# Patient Record
Sex: FEMALE | NOT HISPANIC OR LATINO | URBAN - METROPOLITAN AREA
[De-identification: names, ages, dates, MRNs, and addresses within clinical notes are randomized per-mention and may not be internally consistent; named-entity substitution may affect disease eponyms.]

---

## 2023-08-14 ENCOUNTER — HOSPITAL ENCOUNTER (OUTPATIENT)
Dept: DATA CONVERSION | Facility: HOSPITAL | Age: 58
Discharge: HOME | End: 2023-08-14

## 2023-08-14 DIAGNOSIS — R87.610 ATYPICAL SQUAMOUS CELLS OF UNDETERMINED SIGNIFICANCE ON CYTOLOGIC SMEAR OF CERVIX (ASC-US): ICD-10-CM

## 2023-08-14 DIAGNOSIS — Z01.419 ENCOUNTER FOR GYNECOLOGICAL EXAMINATION (GENERAL) (ROUTINE) WITHOUT ABNORMAL FINDINGS: ICD-10-CM

## 2023-08-14 DIAGNOSIS — R87.810 CERVICAL HIGH RISK HUMAN PAPILLOMAVIRUS (HPV) DNA TEST POSITIVE: ICD-10-CM

## 2023-09-12 ENCOUNTER — HOSPITAL ENCOUNTER (OUTPATIENT)
Dept: DATA CONVERSION | Facility: HOSPITAL | Age: 58
End: 2023-09-12

## 2023-09-12 DIAGNOSIS — Z12.31 ENCOUNTER FOR SCREENING MAMMOGRAM FOR MALIGNANT NEOPLASM OF BREAST: ICD-10-CM

## 2023-09-12 DIAGNOSIS — Z72.0 TOBACCO USE: ICD-10-CM

## 2023-09-30 ENCOUNTER — HOSPITAL ENCOUNTER (OUTPATIENT)
Dept: RADIOLOGY | Facility: HOSPITAL | Age: 58
Discharge: HOME | End: 2023-09-30
Payer: COMMERCIAL

## 2023-09-30 ENCOUNTER — APPOINTMENT (OUTPATIENT)
Dept: RADIOLOGY | Facility: HOSPITAL | Age: 58
End: 2023-09-30

## 2023-09-30 VITALS — WEIGHT: 170 LBS | BODY MASS INDEX: 28.32 KG/M2 | HEIGHT: 65 IN

## 2023-09-30 DIAGNOSIS — Z12.31 ENCOUNTER FOR SCREENING MAMMOGRAM FOR MALIGNANT NEOPLASM OF BREAST: ICD-10-CM

## 2023-09-30 DIAGNOSIS — Z72.0 TOBACCO USE: ICD-10-CM

## 2023-09-30 PROCEDURE — 77067 SCR MAMMO BI INCL CAD: CPT | Mod: 50

## 2023-09-30 PROCEDURE — 77063 BREAST TOMOSYNTHESIS BI: CPT | Mod: 50

## 2023-09-30 PROCEDURE — 71271 CT THORAX LUNG CANCER SCR C-: CPT

## 2023-10-27 ENCOUNTER — HOSPITAL ENCOUNTER (OUTPATIENT)
Dept: RADIOLOGY | Facility: HOSPITAL | Age: 58
Discharge: HOME | End: 2023-10-27
Payer: COMMERCIAL

## 2023-10-27 DIAGNOSIS — R92.8 OTHER ABNORMAL AND INCONCLUSIVE FINDINGS ON DIAGNOSTIC IMAGING OF BREAST: ICD-10-CM

## 2023-10-27 PROCEDURE — 77065 DX MAMMO INCL CAD UNI: CPT | Mod: LT

## 2023-10-27 PROCEDURE — 76642 ULTRASOUND BREAST LIMITED: CPT | Mod: LT

## 2024-01-24 PROBLEM — F41.9 ANXIETY AND DEPRESSION: Status: ACTIVE | Noted: 2023-09-12

## 2024-01-24 PROBLEM — K21.9 GASTROESOPHAGEAL REFLUX DISEASE: Status: ACTIVE | Noted: 2021-10-30

## 2024-01-24 PROBLEM — F32.A ANXIETY AND DEPRESSION: Status: ACTIVE | Noted: 2023-09-12

## 2024-01-24 NOTE — PROGRESS NOTES
Subjective   Patient ID:   Marni Harkins is a 58 y.o. female who presents for No chief complaint on file..  HPI  New patient here today to establish care with myself.  Previous PCP: Vicky Cruz PA-C  Last seen: Sep 2023.    GERD:  Taking Prilosec.    Anxiety/Depression:  Taking Cymbalta.  Denies SI/HI.    Health maintenance:  Smoking:  Mammogram (40-75): Sep 2023 - abnormal. Diagnostic normal in Oct 2023.  Labs: July 2023.  Colonoscopy (50-75): DUE  Influenza:    Review of Systems  12 point review of systems negative unless stated above in HPI    There were no vitals filed for this visit.    Physical Exam  General: Alert and oriented, well nourished, no acute distress.  Lungs: Clear to auscultation, non-labored respiration.  Heart: Normal rate, regular rhythm, no murmur, gallop or edema.  Neurologic: Awake, alert, and oriented X3, CN II-XII intact.  Psychiatric: Cooperative, appropriate mood and affect.    Assessment/Plan   Diagnoses and all orders for this visit:  Anxiety and depression  Gastroesophageal reflux disease without esophagitis

## 2024-01-25 ENCOUNTER — APPOINTMENT (OUTPATIENT)
Dept: PRIMARY CARE | Facility: CLINIC | Age: 59
End: 2024-01-25
Payer: COMMERCIAL

## 2024-01-26 ENCOUNTER — APPOINTMENT (OUTPATIENT)
Dept: PRIMARY CARE | Facility: CLINIC | Age: 59
End: 2024-01-26
Payer: COMMERCIAL

## 2024-02-01 ENCOUNTER — OFFICE VISIT (OUTPATIENT)
Dept: PRIMARY CARE | Facility: CLINIC | Age: 59
End: 2024-02-01
Payer: COMMERCIAL

## 2024-02-01 VITALS
HEIGHT: 64 IN | BODY MASS INDEX: 31.79 KG/M2 | OXYGEN SATURATION: 97 % | WEIGHT: 186.2 LBS | RESPIRATION RATE: 18 BRPM | TEMPERATURE: 98.1 F | HEART RATE: 84 BPM | DIASTOLIC BLOOD PRESSURE: 84 MMHG | SYSTOLIC BLOOD PRESSURE: 140 MMHG

## 2024-02-01 DIAGNOSIS — R42 DIZZINESS: Primary | ICD-10-CM

## 2024-02-01 PROCEDURE — 99213 OFFICE O/P EST LOW 20 MIN: CPT

## 2024-02-01 RX ORDER — DULOXETINE HYDROCHLORIDE 20 MG/1
20 CAPSULE, DELAYED RELEASE ORAL
COMMUNITY
Start: 2023-09-12 | End: 2024-02-01

## 2024-02-01 RX ORDER — MECLIZINE HYDROCHLORIDE 25 MG/1
25 TABLET ORAL 3 TIMES DAILY PRN
Qty: 30 TABLET | Refills: 11 | Status: SHIPPED | OUTPATIENT
Start: 2024-02-01 | End: 2025-01-31

## 2024-02-01 RX ORDER — DOCOSAHEXAENOIC ACID/EPA 120-180 MG
CAPSULE ORAL DAILY
COMMUNITY

## 2024-02-01 RX ORDER — OMEPRAZOLE 20 MG/1
20 CAPSULE, DELAYED RELEASE ORAL
COMMUNITY
Start: 2024-01-22

## 2024-02-01 ASSESSMENT — PATIENT HEALTH QUESTIONNAIRE - PHQ9
SUM OF ALL RESPONSES TO PHQ9 QUESTIONS 1 & 2: 1
2. FEELING DOWN, DEPRESSED OR HOPELESS: SEVERAL DAYS
10. IF YOU CHECKED OFF ANY PROBLEMS, HOW DIFFICULT HAVE THESE PROBLEMS MADE IT FOR YOU TO DO YOUR WORK, TAKE CARE OF THINGS AT HOME, OR GET ALONG WITH OTHER PEOPLE: NOT DIFFICULT AT ALL
1. LITTLE INTEREST OR PLEASURE IN DOING THINGS: NOT AT ALL

## 2024-02-01 ASSESSMENT — PAIN SCALES - GENERAL: PAINLEVEL: 0-NO PAIN

## 2024-02-01 ASSESSMENT — COLUMBIA-SUICIDE SEVERITY RATING SCALE - C-SSRS
6. HAVE YOU EVER DONE ANYTHING, STARTED TO DO ANYTHING, OR PREPARED TO DO ANYTHING TO END YOUR LIFE?: NO
2. HAVE YOU ACTUALLY HAD ANY THOUGHTS OF KILLING YOURSELF?: NO
1. IN THE PAST MONTH, HAVE YOU WISHED YOU WERE DEAD OR WISHED YOU COULD GO TO SLEEP AND NOT WAKE UP?: NO

## 2024-02-01 NOTE — PROGRESS NOTES
"Subjective   Patient ID: Marni Harkins is a 58 y.o. female who presents for medication adjustment and not feeling well Dizzy.    HPI   Marni presents for concerns of dizziness following abruptly discontinuing her Cymbalta about 3 weeks ago. Admits the dizziness has been somewhat improved over the last few days. She reports she feels dizzy when she's tired most days but she feels some days are worse than other. She reports the dizziness hits her in waves, they come an go quickly, no other symptoms present with dizziness. Denies N/V. Denies vision changes, photophobia. Denies any hearing changes.  Dizziness is not associated with any particular location, occurs at home and at work. She admits that she only sleeps about 5-6 hours per night, wakes very early for work at 3:45 am daily.    Review of Systems   Constitutional:  Negative for activity change, appetite change, chills, diaphoresis, fatigue, fever and unexpected weight change.   HENT:  Negative for congestion, ear discharge, ear pain, hearing loss, postnasal drip, rhinorrhea, sinus pressure and tinnitus.    Eyes:  Negative for photophobia and visual disturbance.   Respiratory: Negative.     Cardiovascular:  Negative for chest pain and palpitations.   Gastrointestinal: Negative.    Skin: Negative.    Neurological:  Positive for dizziness. Negative for seizures, speech difficulty, weakness, light-headedness, numbness and headaches.         Objective   /84   Pulse 84   Temp 36.7 °C (98.1 °F)   Resp 18   Ht 1.626 m (5' 4\")   Wt 84.5 kg (186 lb 3.2 oz)   SpO2 97%   BMI 31.96 kg/m²     Physical Exam  Vitals and nursing note reviewed.   Constitutional:       General: She is not in acute distress.     Appearance: Normal appearance.   HENT:      Right Ear: Tympanic membrane, ear canal and external ear normal.      Left Ear: Tympanic membrane, ear canal and external ear normal.      Nose: Nose normal. No congestion or rhinorrhea.   Cardiovascular:    "   Rate and Rhythm: Normal rate and regular rhythm.      Heart sounds: Normal heart sounds.   Pulmonary:      Effort: Pulmonary effort is normal.      Breath sounds: Normal breath sounds.   Skin:     General: Skin is warm and dry.      Capillary Refill: Capillary refill takes less than 2 seconds.   Neurological:      General: No focal deficit present.      Mental Status: She is alert and oriented to person, place, and time.      Cranial Nerves: No cranial nerve deficit.      Sensory: No sensory deficit.      Motor: No weakness.      Coordination: Coordination normal.      Gait: Gait normal.         Assessment/Plan   Problem List Items Addressed This Visit    None  Visit Diagnoses         Codes    Dizziness    -  Primary  Acute, improving.  Physical exam unremarkable.  Discussed possible causes for dizziness. Dizziness may be associated with discontinuing medication abruptly. She is to keep a record of episodes and the events surrounding for further evaluation. May use Antivert if needed. Explained intended effects, potential side effects, and schedule of dosages of the medication.  Will obtain labs to r/o electrolyte imbalance, dehydration, or anemia as discussed.  R42    Relevant Medications    meclizine (Antivert) 25 mg tablet    Other Relevant Orders    CBC and Auto Differential    Comprehensive metabolic panel

## 2024-02-04 ASSESSMENT — ENCOUNTER SYMPTOMS
PHOTOPHOBIA: 0
NUMBNESS: 0
APPETITE CHANGE: 0
UNEXPECTED WEIGHT CHANGE: 0
ACTIVITY CHANGE: 0
LIGHT-HEADEDNESS: 0
RESPIRATORY NEGATIVE: 1
RHINORRHEA: 0
DIZZINESS: 1
GASTROINTESTINAL NEGATIVE: 1
HEADACHES: 0
SEIZURES: 0
CHILLS: 0
PALPITATIONS: 0
FATIGUE: 0
FEVER: 0
WEAKNESS: 0
SINUS PRESSURE: 0
DIAPHORESIS: 0
SPEECH DIFFICULTY: 0

## 2024-02-21 ENCOUNTER — LAB (OUTPATIENT)
Dept: LAB | Facility: LAB | Age: 59
End: 2024-02-21
Payer: COMMERCIAL

## 2024-02-21 DIAGNOSIS — R42 DIZZINESS: ICD-10-CM

## 2024-02-21 LAB
ALBUMIN SERPL-MCNC: 4.3 G/DL (ref 3.5–5)
ALP BLD-CCNC: 30 U/L (ref 35–125)
ALT SERPL-CCNC: 17 U/L (ref 5–40)
ANION GAP SERPL CALC-SCNC: 13 MMOL/L
AST SERPL-CCNC: 16 U/L (ref 5–40)
BASOPHILS # BLD AUTO: 0.06 X10*3/UL (ref 0–0.1)
BASOPHILS NFR BLD AUTO: 0.8 %
BILIRUB SERPL-MCNC: <0.2 MG/DL (ref 0.1–1.2)
BUN SERPL-MCNC: 15 MG/DL (ref 8–25)
CALCIUM SERPL-MCNC: 10 MG/DL (ref 8.5–10.4)
CHLORIDE SERPL-SCNC: 102 MMOL/L (ref 97–107)
CO2 SERPL-SCNC: 26 MMOL/L (ref 24–31)
CREAT SERPL-MCNC: 1 MG/DL (ref 0.4–1.6)
EGFRCR SERPLBLD CKD-EPI 2021: 65 ML/MIN/1.73M*2
EOSINOPHIL # BLD AUTO: 0.18 X10*3/UL (ref 0–0.7)
EOSINOPHIL NFR BLD AUTO: 2.5 %
ERYTHROCYTE [DISTWIDTH] IN BLOOD BY AUTOMATED COUNT: 13.6 % (ref 11.5–14.5)
GLUCOSE SERPL-MCNC: 91 MG/DL (ref 65–99)
HCT VFR BLD AUTO: 39.8 % (ref 36–46)
HGB BLD-MCNC: 12.8 G/DL (ref 12–16)
IMM GRANULOCYTES # BLD AUTO: 0.02 X10*3/UL (ref 0–0.7)
IMM GRANULOCYTES NFR BLD AUTO: 0.3 % (ref 0–0.9)
LYMPHOCYTES # BLD AUTO: 2.33 X10*3/UL (ref 1.2–4.8)
LYMPHOCYTES NFR BLD AUTO: 32.9 %
MCH RBC QN AUTO: 28.7 PG (ref 26–34)
MCHC RBC AUTO-ENTMCNC: 32.2 G/DL (ref 32–36)
MCV RBC AUTO: 89 FL (ref 80–100)
MONOCYTES # BLD AUTO: 0.67 X10*3/UL (ref 0.1–1)
MONOCYTES NFR BLD AUTO: 9.5 %
NEUTROPHILS # BLD AUTO: 3.82 X10*3/UL (ref 1.2–7.7)
NEUTROPHILS NFR BLD AUTO: 54 %
NRBC BLD-RTO: 0 /100 WBCS (ref 0–0)
PLATELET # BLD AUTO: 339 X10*3/UL (ref 150–450)
POTASSIUM SERPL-SCNC: 4.4 MMOL/L (ref 3.4–5.1)
PROT SERPL-MCNC: 7 G/DL (ref 5.9–7.9)
RBC # BLD AUTO: 4.46 X10*6/UL (ref 4–5.2)
SODIUM SERPL-SCNC: 141 MMOL/L (ref 133–145)
WBC # BLD AUTO: 7.1 X10*3/UL (ref 4.4–11.3)

## 2024-02-21 PROCEDURE — 36415 COLL VENOUS BLD VENIPUNCTURE: CPT

## 2024-02-21 PROCEDURE — 80053 COMPREHEN METABOLIC PANEL: CPT

## 2024-02-21 PROCEDURE — 85025 COMPLETE CBC W/AUTO DIFF WBC: CPT

## 2024-03-14 ENCOUNTER — OFFICE VISIT (OUTPATIENT)
Dept: PRIMARY CARE | Facility: CLINIC | Age: 59
End: 2024-03-14
Payer: COMMERCIAL

## 2024-03-14 VITALS
HEART RATE: 81 BPM | WEIGHT: 189.2 LBS | SYSTOLIC BLOOD PRESSURE: 142 MMHG | OXYGEN SATURATION: 96 % | BODY MASS INDEX: 32.48 KG/M2 | DIASTOLIC BLOOD PRESSURE: 80 MMHG

## 2024-03-14 DIAGNOSIS — I10 PRIMARY HYPERTENSION: Primary | ICD-10-CM

## 2024-03-14 DIAGNOSIS — R42 DIZZINESS: ICD-10-CM

## 2024-03-14 PROCEDURE — 3077F SYST BP >= 140 MM HG: CPT

## 2024-03-14 PROCEDURE — 3079F DIAST BP 80-89 MM HG: CPT

## 2024-03-14 PROCEDURE — 99214 OFFICE O/P EST MOD 30 MIN: CPT

## 2024-03-14 RX ORDER — LISINOPRIL 5 MG/1
5 TABLET ORAL DAILY
Qty: 30 TABLET | Refills: 0 | Status: SHIPPED | OUTPATIENT
Start: 2024-03-14 | End: 2024-04-15 | Stop reason: SDUPTHER

## 2024-03-14 ASSESSMENT — PATIENT HEALTH QUESTIONNAIRE - PHQ9
2. FEELING DOWN, DEPRESSED OR HOPELESS: NOT AT ALL
1. LITTLE INTEREST OR PLEASURE IN DOING THINGS: NOT AT ALL
2. FEELING DOWN, DEPRESSED OR HOPELESS: NOT AT ALL
1. LITTLE INTEREST OR PLEASURE IN DOING THINGS: NOT AT ALL
SUM OF ALL RESPONSES TO PHQ9 QUESTIONS 1 AND 2: 0
SUM OF ALL RESPONSES TO PHQ9 QUESTIONS 1 AND 2: 0

## 2024-03-14 ASSESSMENT — PAIN SCALES - GENERAL: PAINLEVEL: 0-NO PAIN

## 2024-03-14 NOTE — PROGRESS NOTES
Subjective   Patient ID: Marni Harkins is a 58 y.o. female who presents for Hypertension, Dizziness (At times ), and Medication Question (Rx for urinary leakage /Rx dry lips-was given something from [facility] previously ).    HPI     Marni presents for concerns for elevated BP.  Her BP was elevated at her last visit 140/84 and 142/80 at this visit.  She has been tracking her home BPs which has been 130-140s/70s-80s.   She reports occasional dizziness.  Reports episodes occur on occasion with no regularity, not tied with any other symptoms- no syncope, changes in metal status. Denies any N/V.  Denies any changes in vision, blurred vision or double vision. Denies chest pain, palpitations. Denies any recent illness.  Episodes occur at different times of day but seem to happen most often when she is tired. She was seen for same symptoms last most, was concerned that her dizziness was connected with a abrupt discontinue of her cymbalta. She does admit that her dizziness has improved since her last visit, with only occasional episodes which resolve quickly.  She reports that she tries to eat healthy but admits she does enjoy her italian foods, high in carbs.  She tried to get to the gym 12 times per months.  Reports she drinks plenty of water daily.     Review of Systems   Constitutional:  Negative for activity change, appetite change, fatigue and unexpected weight change.   Eyes:  Negative for visual disturbance.   Respiratory:  Negative for cough, chest tightness and shortness of breath.    Cardiovascular:  Negative for chest pain, palpitations and leg swelling.   Gastrointestinal:  Negative for abdominal pain, constipation, diarrhea, nausea and vomiting.   Genitourinary:  Negative for dysuria, frequency and urgency.   Skin:  Negative for color change and rash.   Neurological:  Positive for dizziness. Negative for syncope, weakness, light-headedness and headaches.       Objective   /80   Pulse 81   Wt 85.8 kg  (189 lb 3.2 oz)   SpO2 96%   BMI 32.48 kg/m²     Physical Exam  Vitals and nursing note reviewed.   Constitutional:       General: She is not in acute distress.     Appearance: Normal appearance.   Eyes:      Extraocular Movements: Extraocular movements intact.      Conjunctiva/sclera: Conjunctivae normal.      Pupils: Pupils are equal, round, and reactive to light.   Neck:      Vascular: No carotid bruit.   Cardiovascular:      Rate and Rhythm: Normal rate and regular rhythm.      Pulses: Normal pulses.      Heart sounds: Normal heart sounds, S1 normal and S2 normal. No murmur heard.  Pulmonary:      Effort: Pulmonary effort is normal.      Breath sounds: Normal breath sounds.   Musculoskeletal:      Cervical back: Normal range of motion and neck supple.      Right lower leg: No edema.      Left lower leg: No edema.   Lymphadenopathy:      Cervical: No cervical adenopathy.   Skin:     General: Skin is warm and dry.      Capillary Refill: Capillary refill takes less than 2 seconds.   Neurological:      General: No focal deficit present.      Mental Status: She is alert and oriented to person, place, and time. Mental status is at baseline.   Psychiatric:         Mood and Affect: Mood normal.         Behavior: Behavior normal.         Thought Content: Thought content normal.         Judgment: Judgment normal.         Assessment/Plan   Problem List Items Addressed This Visit    None  Visit Diagnoses         Codes    Primary hypertension    -  Primary  Begin lisinopril 5 mg daily as discussed for better BP control.   Explained intended effects, potential side effects, and schedule of dosages of the medication.  Discussed reducing sodium intake and processed foods.   Continue with home monitoring.  Follow up in 1 month for reevaluation.  I10    Relevant Medications    lisinopril 5 mg tablet    Dizziness      Dizziness may be connected to perpetually elevated BP.    Physical exam at last visit and this visit are  unremarkable.  Patient will continue to monitor, follow up in 1 month with BP recheck.  Discussed sooner follow up if symptoms seem to change or worsen.  R42

## 2024-03-15 ENCOUNTER — TELEPHONE (OUTPATIENT)
Dept: PRIMARY CARE | Facility: CLINIC | Age: 59
End: 2024-03-15
Payer: COMMERCIAL

## 2024-03-15 NOTE — TELEPHONE ENCOUNTER
PT CHECKED WITH HER INSURANCE AND THEY DO NOT COVER WEIGHT LOSS  INJECTIONS WITHOUT A PA.  THEY WILL COVER METFORMIN .

## 2024-03-17 ASSESSMENT — ENCOUNTER SYMPTOMS
DIARRHEA: 0
LIGHT-HEADEDNESS: 0
COLOR CHANGE: 0
WEAKNESS: 0
ABDOMINAL PAIN: 0
FATIGUE: 0
DYSURIA: 0
COUGH: 0
ACTIVITY CHANGE: 0
CHEST TIGHTNESS: 0
PALPITATIONS: 0
FREQUENCY: 0
HEADACHES: 0
VOMITING: 0
CONSTIPATION: 0
UNEXPECTED WEIGHT CHANGE: 0
NAUSEA: 0
DIZZINESS: 1
SHORTNESS OF BREATH: 0
APPETITE CHANGE: 0

## 2024-04-12 PROBLEM — E78.00 PURE HYPERCHOLESTEROLEMIA: Status: ACTIVE | Noted: 2023-01-16

## 2024-04-12 PROBLEM — M26.629 TMJ SYNDROME: Status: ACTIVE | Noted: 2019-04-23

## 2024-04-12 PROBLEM — E04.9 GOITER: Status: ACTIVE | Noted: 2019-03-11

## 2024-04-12 PROBLEM — M77.9 ENTHESOPATHY: Status: ACTIVE | Noted: 2021-10-30

## 2024-04-12 PROBLEM — R73.01 IMPAIRED FASTING GLUCOSE: Status: ACTIVE | Noted: 2021-02-04

## 2024-04-12 PROBLEM — R87.810 ASCUS WITH POSITIVE HIGH RISK HPV CERVICAL: Status: ACTIVE | Noted: 2020-05-14

## 2024-04-12 PROBLEM — M72.2 PLANTAR FASCIITIS: Status: ACTIVE | Noted: 2018-10-29

## 2024-04-12 PROBLEM — F43.23 ADJUSTMENT DISORDER WITH MIXED ANXIETY AND DEPRESSED MOOD: Status: ACTIVE | Noted: 2021-10-30

## 2024-04-12 PROBLEM — R87.610 ASCUS WITH POSITIVE HIGH RISK HPV CERVICAL: Status: ACTIVE | Noted: 2020-05-14

## 2024-04-12 PROBLEM — N87.9 CERVICAL ATYPIA: Status: ACTIVE | Noted: 2023-08-14

## 2024-04-12 PROBLEM — R87.810 HIGH-RISK HUMAN PAPILLOMAVIRUS (HPV) DNA DETECTED IN CERVICAL SPECIMEN: Status: ACTIVE | Noted: 2023-08-14

## 2024-04-15 ENCOUNTER — OFFICE VISIT (OUTPATIENT)
Dept: PRIMARY CARE | Facility: CLINIC | Age: 59
End: 2024-04-15
Payer: COMMERCIAL

## 2024-04-15 ENCOUNTER — TELEPHONE (OUTPATIENT)
Dept: PRIMARY CARE | Facility: CLINIC | Age: 59
End: 2024-04-15

## 2024-04-15 VITALS
TEMPERATURE: 98.1 F | BODY MASS INDEX: 31.41 KG/M2 | SYSTOLIC BLOOD PRESSURE: 120 MMHG | HEART RATE: 77 BPM | OXYGEN SATURATION: 97 % | HEIGHT: 64 IN | WEIGHT: 184 LBS | DIASTOLIC BLOOD PRESSURE: 68 MMHG

## 2024-04-15 DIAGNOSIS — E78.00 PURE HYPERCHOLESTEROLEMIA: ICD-10-CM

## 2024-04-15 DIAGNOSIS — I10 PRIMARY HYPERTENSION: ICD-10-CM

## 2024-04-15 DIAGNOSIS — Z00.00 ANNUAL PHYSICAL EXAM: Primary | ICD-10-CM

## 2024-04-15 PROCEDURE — 3078F DIAST BP <80 MM HG: CPT

## 2024-04-15 PROCEDURE — 99213 OFFICE O/P EST LOW 20 MIN: CPT

## 2024-04-15 PROCEDURE — 3074F SYST BP LT 130 MM HG: CPT

## 2024-04-15 RX ORDER — LISINOPRIL 5 MG/1
5 TABLET ORAL DAILY
Qty: 90 TABLET | Refills: 1 | Status: SHIPPED | OUTPATIENT
Start: 2024-04-15 | End: 2024-10-12

## 2024-04-15 ASSESSMENT — ENCOUNTER SYMPTOMS
ABDOMINAL PAIN: 0
NAUSEA: 0
VOMITING: 0
LIGHT-HEADEDNESS: 0
ACTIVITY CHANGE: 0
WEAKNESS: 0
CHEST TIGHTNESS: 0
UNEXPECTED WEIGHT CHANGE: 0
FREQUENCY: 0
HEADACHES: 0
FATIGUE: 0
COUGH: 0
DIZZINESS: 0
COLOR CHANGE: 0
PALPITATIONS: 0
CONSTIPATION: 0
APPETITE CHANGE: 0
DIARRHEA: 0
SHORTNESS OF BREATH: 0
DYSURIA: 0

## 2024-04-15 ASSESSMENT — PAIN SCALES - GENERAL: PAINLEVEL: 0-NO PAIN

## 2024-04-15 ASSESSMENT — PATIENT HEALTH QUESTIONNAIRE - PHQ9
1. LITTLE INTEREST OR PLEASURE IN DOING THINGS: NOT AT ALL
SUM OF ALL RESPONSES TO PHQ9 QUESTIONS 1 AND 2: 0
2. FEELING DOWN, DEPRESSED OR HOPELESS: NOT AT ALL

## 2024-04-15 NOTE — PROGRESS NOTES
"Subjective   Patient ID: Marni Harkins is a 58 y.o. female who presents for Blood Pressure Check.    HPI     Marni presents for follow up of essential hypertension.   Taking lisinopril 5 mg daily.  Tolerating medications well.  /68  Denies change in vision, headache, or dizziness.   No complaints of chest pain, palpitations, or shortness of breath.      Review of Systems   Constitutional:  Negative for activity change, appetite change, fatigue and unexpected weight change.   Eyes:  Negative for visual disturbance.   Respiratory:  Negative for cough, chest tightness and shortness of breath.    Cardiovascular:  Negative for chest pain, palpitations and leg swelling.   Gastrointestinal:  Negative for abdominal pain, constipation, diarrhea, nausea and vomiting.   Genitourinary:  Negative for dysuria, frequency and urgency.   Skin:  Negative for color change and rash.   Neurological:  Negative for dizziness, syncope, weakness, light-headedness and headaches.             Objective   /68 (BP Location: Left arm)   Pulse 77   Temp 36.7 °C (98.1 °F) (Temporal)   Ht 1.626 m (5' 4\")   Wt 83.5 kg (184 lb)   SpO2 97%   BMI 31.58 kg/m²     Physical Exam  Vitals and nursing note reviewed.   Constitutional:       General: She is not in acute distress.     Appearance: Normal appearance.   Neck:      Vascular: No carotid bruit.   Cardiovascular:      Rate and Rhythm: Normal rate and regular rhythm.      Pulses: Normal pulses.      Heart sounds: Normal heart sounds, S1 normal and S2 normal. No murmur heard.  Pulmonary:      Effort: Pulmonary effort is normal.      Breath sounds: Normal breath sounds and air entry.   Musculoskeletal:      Cervical back: Normal range of motion and neck supple.      Right lower leg: No edema.      Left lower leg: No edema.   Lymphadenopathy:      Cervical: No cervical adenopathy.   Skin:     General: Skin is warm and dry.   Neurological:      General: No focal deficit present. "      Mental Status: She is alert. Mental status is at baseline.   Psychiatric:         Behavior: Behavior is cooperative.           Assessment/Plan   Problem List Items Addressed This Visit    None  Visit Diagnoses         Codes    Primary hypertension      Chronic condition, stable at this visit  Continue lisinopril 5 mg daily as prescribed.  Monitor home blood pressures.  Call if blood pressure consistently >140/90.  Non pharmacological interventions such as lowering salt, saturated fats, cholesterol, and sugar diet discussed.  Increase physical activity, aim for 30 minutes 5 days per week as able.  Stress reduction interventions discussed.  Discussed signs and symptoms of major cardiovascular event and need to present to the ED.   Reevaluate in 6 months.   I10    Relevant Medications    lisinopril 5 mg tablet

## 2024-09-12 ENCOUNTER — TELEPHONE (OUTPATIENT)
Dept: PRIMARY CARE | Facility: CLINIC | Age: 59
End: 2024-09-12
Payer: COMMERCIAL

## 2024-09-12 DIAGNOSIS — I10 PRIMARY HYPERTENSION: Primary | ICD-10-CM

## 2024-09-12 RX ORDER — LOSARTAN POTASSIUM 25 MG/1
25 TABLET ORAL DAILY
Qty: 30 TABLET | Refills: 1 | Status: SHIPPED | OUTPATIENT
Start: 2024-09-12 | End: 2024-10-08 | Stop reason: SDUPTHER

## 2024-09-12 NOTE — TELEPHONE ENCOUNTER
Patient stated one of the side effects of Lisinopril is a cough, the cough is getting worse. Patient is requesting a new medication. Offered Patient an appointment for medication change.    Patient stated Omeprazole isn't working and she would like to try something new.    Patient's CPE is 10-25-24.    Patient can be reached at [phone]

## 2024-09-19 PROCEDURE — 88175 CYTOPATH C/V AUTO FLUID REDO: CPT

## 2024-09-19 PROCEDURE — 87624 HPV HI-RISK TYP POOLED RSLT: CPT

## 2024-09-19 PROCEDURE — 88141 CYTOPATH C/V INTERPRET: CPT | Performed by: PATHOLOGY

## 2024-09-20 ENCOUNTER — LAB REQUISITION (OUTPATIENT)
Dept: LAB | Facility: HOSPITAL | Age: 59
End: 2024-09-20
Payer: COMMERCIAL

## 2024-09-20 DIAGNOSIS — R87.610 ATYPICAL SQUAMOUS CELLS OF UNDETERMINED SIGNIFICANCE ON CYTOLOGIC SMEAR OF CERVIX (ASC-US): ICD-10-CM

## 2024-09-20 DIAGNOSIS — R87.810 CERVICAL HIGH RISK HUMAN PAPILLOMAVIRUS (HPV) DNA TEST POSITIVE: ICD-10-CM

## 2024-09-20 DIAGNOSIS — Z11.51 ENCOUNTER FOR SCREENING FOR HUMAN PAPILLOMAVIRUS (HPV): ICD-10-CM

## 2024-09-20 DIAGNOSIS — Z01.419 ENCOUNTER FOR GYNECOLOGICAL EXAMINATION (GENERAL) (ROUTINE) WITHOUT ABNORMAL FINDINGS: ICD-10-CM

## 2024-09-23 NOTE — TELEPHONE ENCOUNTER
Attempted to call patient but phone went straight to voicemail . Left message for patient to give the office a call back regarding medication change

## 2024-09-24 NOTE — TELEPHONE ENCOUNTER
Spoke with patient. She has been taking losartan and states the coughing/GI issues seem to be calming down. Doesn't think she needs evaluated right now and that she will follow up at her CPE.

## 2024-10-05 DIAGNOSIS — I10 PRIMARY HYPERTENSION: ICD-10-CM

## 2024-10-07 RX ORDER — LISINOPRIL 5 MG/1
5 TABLET ORAL DAILY
Qty: 90 TABLET | Refills: 0 | Status: SHIPPED | OUTPATIENT
Start: 2024-10-07 | End: 2024-10-08 | Stop reason: ALTCHOICE

## 2024-10-08 ENCOUNTER — TELEPHONE (OUTPATIENT)
Dept: PRIMARY CARE | Facility: CLINIC | Age: 59
End: 2024-10-08
Payer: COMMERCIAL

## 2024-10-08 ENCOUNTER — HOSPITAL ENCOUNTER (OUTPATIENT)
Dept: RADIOLOGY | Facility: HOSPITAL | Age: 59
Discharge: HOME | End: 2024-10-08
Payer: COMMERCIAL

## 2024-10-08 DIAGNOSIS — I10 PRIMARY HYPERTENSION: ICD-10-CM

## 2024-10-08 DIAGNOSIS — Z87.891 PERSONAL HISTORY OF NICOTINE DEPENDENCE: ICD-10-CM

## 2024-10-08 PROCEDURE — 71271 CT THORAX LUNG CANCER SCR C-: CPT

## 2024-10-08 RX ORDER — LOSARTAN POTASSIUM 25 MG/1
25 TABLET ORAL DAILY
Qty: 90 TABLET | Refills: 1 | Status: SHIPPED | OUTPATIENT
Start: 2024-10-08 | End: 2025-04-06

## 2024-10-12 ENCOUNTER — LAB (OUTPATIENT)
Dept: LAB | Facility: LAB | Age: 59
End: 2024-10-12
Payer: COMMERCIAL

## 2024-10-12 DIAGNOSIS — I10 PRIMARY HYPERTENSION: ICD-10-CM

## 2024-10-12 DIAGNOSIS — Z00.00 ANNUAL PHYSICAL EXAM: ICD-10-CM

## 2024-10-12 DIAGNOSIS — E78.00 PURE HYPERCHOLESTEROLEMIA: ICD-10-CM

## 2024-10-12 LAB
ALBUMIN SERPL BCP-MCNC: 4.3 G/DL (ref 3.4–5)
ALP SERPL-CCNC: 22 U/L (ref 33–110)
ALT SERPL W P-5'-P-CCNC: 27 U/L (ref 7–45)
ANION GAP SERPL CALCULATED.3IONS-SCNC: 11 MMOL/L (ref 10–20)
APPEARANCE UR: CLEAR
AST SERPL W P-5'-P-CCNC: 20 U/L (ref 9–39)
BASOPHILS # BLD AUTO: 0.05 X10*3/UL (ref 0–0.1)
BASOPHILS NFR BLD AUTO: 0.8 %
BILIRUB SERPL-MCNC: 0.4 MG/DL (ref 0–1.2)
BILIRUB UR STRIP.AUTO-MCNC: NEGATIVE MG/DL
BUN SERPL-MCNC: 14 MG/DL (ref 6–23)
CALCIUM SERPL-MCNC: 9.4 MG/DL (ref 8.6–10.3)
CHLORIDE SERPL-SCNC: 104 MMOL/L (ref 98–107)
CHOLEST SERPL-MCNC: 246 MG/DL (ref 0–199)
CHOLEST/HDLC SERPL: 3.6 {RATIO}
CO2 SERPL-SCNC: 29 MMOL/L (ref 21–32)
COLOR UR: NORMAL
CREAT SERPL-MCNC: 0.8 MG/DL (ref 0.5–1.05)
CREAT UR-MCNC: 137.1 MG/DL (ref 20–320)
EGFRCR SERPLBLD CKD-EPI 2021: 85 ML/MIN/1.73M*2
EOSINOPHIL # BLD AUTO: 0.13 X10*3/UL (ref 0–0.7)
EOSINOPHIL NFR BLD AUTO: 2.1 %
ERYTHROCYTE [DISTWIDTH] IN BLOOD BY AUTOMATED COUNT: 13.6 % (ref 11.5–14.5)
GLUCOSE SERPL-MCNC: 90 MG/DL (ref 74–99)
GLUCOSE UR STRIP.AUTO-MCNC: NORMAL MG/DL
HCT VFR BLD AUTO: 41.3 % (ref 36–46)
HDLC SERPL-MCNC: 68.9 MG/DL
HGB BLD-MCNC: 13.3 G/DL (ref 12–16)
IMM GRANULOCYTES # BLD AUTO: 0.02 X10*3/UL (ref 0–0.7)
IMM GRANULOCYTES NFR BLD AUTO: 0.3 % (ref 0–0.9)
KETONES UR STRIP.AUTO-MCNC: NEGATIVE MG/DL
LDLC SERPL CALC-MCNC: 148 MG/DL
LEUKOCYTE ESTERASE UR QL STRIP.AUTO: NEGATIVE
LYMPHOCYTES # BLD AUTO: 2.02 X10*3/UL (ref 1.2–4.8)
LYMPHOCYTES NFR BLD AUTO: 32.8 %
MCH RBC QN AUTO: 29.1 PG (ref 26–34)
MCHC RBC AUTO-ENTMCNC: 32.2 G/DL (ref 32–36)
MCV RBC AUTO: 90 FL (ref 80–100)
MICROALBUMIN UR-MCNC: <7 MG/L
MICROALBUMIN/CREAT UR: NORMAL MG/G{CREAT}
MONOCYTES # BLD AUTO: 0.6 X10*3/UL (ref 0.1–1)
MONOCYTES NFR BLD AUTO: 9.7 %
NEUTROPHILS # BLD AUTO: 3.34 X10*3/UL (ref 1.2–7.7)
NEUTROPHILS NFR BLD AUTO: 54.3 %
NITRITE UR QL STRIP.AUTO: NEGATIVE
NON HDL CHOLESTEROL: 177 MG/DL (ref 0–149)
NRBC BLD-RTO: 0 /100 WBCS (ref 0–0)
PH UR STRIP.AUTO: 6.5 [PH]
PLATELET # BLD AUTO: 338 X10*3/UL (ref 150–450)
POTASSIUM SERPL-SCNC: 4 MMOL/L (ref 3.5–5.3)
PROT SERPL-MCNC: 6.9 G/DL (ref 6.4–8.2)
PROT UR STRIP.AUTO-MCNC: NEGATIVE MG/DL
RBC # BLD AUTO: 4.57 X10*6/UL (ref 4–5.2)
RBC # UR STRIP.AUTO: NEGATIVE /UL
SODIUM SERPL-SCNC: 140 MMOL/L (ref 136–145)
SP GR UR STRIP.AUTO: 1.02
TRIGL SERPL-MCNC: 146 MG/DL (ref 0–149)
UROBILINOGEN UR STRIP.AUTO-MCNC: NORMAL MG/DL
VLDL: 29 MG/DL (ref 0–40)
WBC # BLD AUTO: 6.2 X10*3/UL (ref 4.4–11.3)

## 2024-10-12 PROCEDURE — 85025 COMPLETE CBC W/AUTO DIFF WBC: CPT

## 2024-10-12 PROCEDURE — 82043 UR ALBUMIN QUANTITATIVE: CPT

## 2024-10-12 PROCEDURE — 80053 COMPREHEN METABOLIC PANEL: CPT

## 2024-10-12 PROCEDURE — 82570 ASSAY OF URINE CREATININE: CPT

## 2024-10-12 PROCEDURE — 80061 LIPID PANEL: CPT

## 2024-10-12 PROCEDURE — 81003 URINALYSIS AUTO W/O SCOPE: CPT

## 2024-10-12 PROCEDURE — 36415 COLL VENOUS BLD VENIPUNCTURE: CPT

## 2024-10-17 ENCOUNTER — APPOINTMENT (OUTPATIENT)
Dept: PRIMARY CARE | Facility: CLINIC | Age: 59
End: 2024-10-17
Payer: COMMERCIAL

## 2024-10-22 ENCOUNTER — HOSPITAL ENCOUNTER (OUTPATIENT)
Dept: RADIOLOGY | Facility: HOSPITAL | Age: 59
Discharge: HOME | End: 2024-10-22
Payer: COMMERCIAL

## 2024-10-22 VITALS — BODY MASS INDEX: 31.76 KG/M2 | HEIGHT: 64 IN | WEIGHT: 186 LBS

## 2024-10-22 DIAGNOSIS — Z12.31 ENCOUNTER FOR SCREENING MAMMOGRAM FOR MALIGNANT NEOPLASM OF BREAST: ICD-10-CM

## 2024-10-22 PROCEDURE — 77067 SCR MAMMO BI INCL CAD: CPT | Performed by: RADIOLOGY

## 2024-10-22 PROCEDURE — 77067 SCR MAMMO BI INCL CAD: CPT

## 2024-10-22 PROCEDURE — 77063 BREAST TOMOSYNTHESIS BI: CPT | Performed by: RADIOLOGY

## 2024-10-25 ENCOUNTER — TELEPHONE (OUTPATIENT)
Dept: PRIMARY CARE | Facility: CLINIC | Age: 59
End: 2024-10-25

## 2024-10-25 ENCOUNTER — APPOINTMENT (OUTPATIENT)
Dept: PRIMARY CARE | Facility: CLINIC | Age: 59
End: 2024-10-25
Payer: COMMERCIAL

## 2024-10-25 VITALS
BODY MASS INDEX: 32.61 KG/M2 | WEIGHT: 191 LBS | HEIGHT: 64 IN | SYSTOLIC BLOOD PRESSURE: 134 MMHG | OXYGEN SATURATION: 97 % | DIASTOLIC BLOOD PRESSURE: 80 MMHG | HEART RATE: 66 BPM

## 2024-10-25 DIAGNOSIS — Z12.11 ENCOUNTER FOR SCREENING FOR MALIGNANT NEOPLASM OF COLON: ICD-10-CM

## 2024-10-25 DIAGNOSIS — E78.2 MIXED HYPERLIPIDEMIA: ICD-10-CM

## 2024-10-25 DIAGNOSIS — E66.811 CLASS 1 OBESITY WITH SERIOUS COMORBIDITY AND BODY MASS INDEX (BMI) OF 32.0 TO 32.9 IN ADULT, UNSPECIFIED OBESITY TYPE: ICD-10-CM

## 2024-10-25 DIAGNOSIS — Z13.6 SCREENING FOR HEART DISEASE: ICD-10-CM

## 2024-10-25 DIAGNOSIS — I10 PRIMARY HYPERTENSION: ICD-10-CM

## 2024-10-25 DIAGNOSIS — Z00.00 ANNUAL PHYSICAL EXAM: Primary | ICD-10-CM

## 2024-10-25 RX ORDER — HYDROCHLOROTHIAZIDE 12.5 MG/1
12.5 TABLET ORAL DAILY
Qty: 30 TABLET | Refills: 1 | Status: SHIPPED | OUTPATIENT
Start: 2024-10-25 | End: 2024-12-24

## 2024-10-25 ASSESSMENT — PATIENT HEALTH QUESTIONNAIRE - PHQ9
1. LITTLE INTEREST OR PLEASURE IN DOING THINGS: NOT AT ALL
2. FEELING DOWN, DEPRESSED OR HOPELESS: NOT AT ALL
SUM OF ALL RESPONSES TO PHQ9 QUESTIONS 1 AND 2: 0

## 2024-10-25 ASSESSMENT — PAIN SCALES - GENERAL: PAINLEVEL_OUTOF10: 0-NO PAIN

## 2024-10-25 ASSESSMENT — VISUAL ACUITY: OU: 1

## 2024-10-25 NOTE — PROGRESS NOTES
Subjective   Patient ID: Marni Harkins is a 59 y.o. female who presents for Annual Exam (Thinking about flu shot ).    HPI     Marni Harkins presents for annual physical exam.     She expresses concerns for weight loss today.  Reports she feels she has been trying to loss weigh but seems to continue to gain.  Admits areas for improvement with her diet. Alcohol intake mostly on the weekends but could be up to 8 selter drinks. She has begun exercising, walks on the treadmill and weight lifting 2-3 days per week.       Patient's recent visit notes, medication and allergy lists, past medical surgical social hx, immunization, vitals, problem list, recent tests were reviewed by me for pertinence to this visit.      PMH:   HTN  GERD      Current Outpatient Medications:     fish oil concentrate (Fish OiL) 120-180 mg capsule, Take by mouth once daily., Disp: , Rfl:     losartan (Cozaar) 25 mg tablet, Take 1 tablet (25 mg) by mouth once daily., Disp: 90 tablet, Rfl: 1    meclizine (Antivert) 25 mg tablet, Take 1 tablet (25 mg) by mouth 3 times a day as needed for dizziness., Disp: 30 tablet, Rfl: 11    omeprazole (PriLOSEC) 20 mg DR capsule, Take 1 capsule (20 mg) by mouth once daily in the morning. Take before meals., Disp: , Rfl:     Social Hx:  In a relationship  Works fulltime  Smoking: No - Quit 5 years ago  Alcohol: Yes - socially  Recreational drug use: No      Screening tools:  EKG- 2019- NSR  CT lung screening completed 10/8- nodules and emphysema- 1 year repeat  Colonoscopy- 11/7/2016 w/ Yogesh, 1 polyp, cologuard 9/22/2021- negative  PAP- 9/19/2024 with Dr. Burgess- neg/neg  Mamm completed 10/22/2024-       Vaccinations:  Tdap: updated today  Flu Vaccine: updated today  Shingles: will complete at next visit        Review of Systems    GENERAL - Denies fever/chills, recent illness, unexplained weight loss  HEENT- Denies change in vision, double vision, blurred. Denies hearing changes, ear pain. Denies  "nose bleeds. Denies sore throat, difficulty swallowing.    RESP - Denies SOB or cough  CVS - Denies CP, palpitations.  Slight new bilateral leg edema.  GI - Denies nausea or abdominal pain, hematochezia/melena  - Denies urinary frequency, urgency or incontinence.  Denies nocturia.   NEURO - Denies headache, dizziness  MSK - Denies joint, neck or back pain  Skin - Denies abnormal lesions, rash  PSYCH-Denies anxiety, depression, changes in mood      Objective   /80 (BP Location: Left arm)   Pulse 66   Ht 1.626 m (5' 4\")   Wt 86.6 kg (191 lb)   SpO2 97%   BMI 32.79 kg/m²     Physical Exam  Vitals and nursing note reviewed.   Constitutional:       General: She is not in acute distress.     Appearance: Normal appearance. She is well-developed and well-groomed.   HENT:      Head: Normocephalic.      Jaw: There is normal jaw occlusion.      Right Ear: Hearing, tympanic membrane, ear canal and external ear normal.      Left Ear: Hearing, tympanic membrane, ear canal and external ear normal.      Nose: Nose normal.      Mouth/Throat:      Lips: Pink.      Mouth: Mucous membranes are moist.      Pharynx: Oropharynx is clear. Uvula midline.   Eyes:      General: Lids are normal. Vision grossly intact. Gaze aligned appropriately.      Extraocular Movements: Extraocular movements intact.      Conjunctiva/sclera: Conjunctivae normal.      Pupils: Pupils are equal, round, and reactive to light.   Neck:      Thyroid: No thyromegaly or thyroid tenderness.      Vascular: No carotid bruit or JVD.      Trachea: Trachea and phonation normal.   Cardiovascular:      Rate and Rhythm: Normal rate and regular rhythm.      Pulses: Normal pulses.      Heart sounds: Normal heart sounds, S1 normal and S2 normal.   Pulmonary:      Effort: Pulmonary effort is normal.      Breath sounds: Normal breath sounds and air entry.   Abdominal:      General: Bowel sounds are normal. There is no distension.      Palpations: Abdomen is soft. " There is no hepatomegaly, splenomegaly or mass.      Tenderness: There is no abdominal tenderness. There is no right CVA tenderness, left CVA tenderness, guarding or rebound.   Musculoskeletal:         General: Normal range of motion.      Cervical back: Normal, full passive range of motion without pain, normal range of motion and neck supple.      Thoracic back: Normal.      Lumbar back: Normal.      Right lower le+ Edema present.      Left lower le+ Edema present.   Lymphadenopathy:      Cervical: No cervical adenopathy.   Skin:     General: Skin is warm and dry.      Capillary Refill: Capillary refill takes less than 2 seconds.   Neurological:      General: No focal deficit present.      Mental Status: She is alert and oriented to person, place, and time.      Cranial Nerves: Cranial nerves 2-12 are intact.      Sensory: Sensation is intact.      Motor: Motor function is intact.      Coordination: Coordination is intact.      Gait: Gait is intact.   Psychiatric:         Attention and Perception: Attention normal.         Mood and Affect: Mood and affect normal.         Speech: Speech normal.         Behavior: Behavior normal. Behavior is cooperative.           Assessment & Plan  Annual physical exam  Well adult exam.  1. Age appropriate preventative measures reviewed.   2. Encouraged/discussed healthy diet and exercise at great length today as she is wanting to loose weight.  3. Immunizations- Reviewed, updated Tdap and flu today.  Will update shingles at next visit  4. Labs- reviewed with patient during visit  5. Medications- Reviewed    *Follow-up in 1 year for repeat annual physical exam. Patient verbalizes understanding  regarding plan of care and all questions answered.         Primary hypertension   Chronic condition, stable at this visit  Continue losartan 25 mg daily daily as prescribed. Added hydrochlorothiazide 12.5 mg daily for slight pedal edema.   Monitor home blood pressures.  Call if blood  pressure consistently >140/90.  Non pharmacological interventions such as lowering salt, saturated fats, cholesterol, and sugar diet discussed.  Increase physical activity, aim for 30 minutes 5 days per week as able.  Stress reduction interventions discussed.  Discussed signs and symptoms of major cardiovascular event and need to present to the ED.   Reevaluate in 2 months.    Orders:    hydroCHLOROthiazide (Microzide) 12.5 mg tablet; Take 1 tablet (12.5 mg) by mouth once daily.    Basic metabolic panel; Future    Encounter for screening for malignant neoplasm of colon    Orders:    Referral to Gastroenterology; Future    Screening for heart disease    Orders:    ECG 12 lead (Clinic Performed)    Mixed hyperlipidemia  Chronic condition  Labs show elevated , ,   CT lung screening included CT calcium scoring on 10/8/2024 showed no coronary artery calcium- score was Zero.  We discussed benefits v. Risks of initiating lipid lowering medications.  She is highly motivated to loss weight- we discussed diet and exercise interventions for lipid lowering and weight loss at great length.  Will will plan to re-evaluate in 6 months.        Class 1 obesity with serious comorbidity and body mass index (BMI) of 32.0 to 32.9 in adult, unspecified obesity type  Discussed diet and exercise interventions for weight management.  -tracking meals in a meal tracking orion  -portion control  -reduce simple carbs, sugary beverages  -maintain hydration  -engage in 30-40 minutes of exercise at least 5 days per week

## 2024-12-10 ENCOUNTER — LAB (OUTPATIENT)
Dept: LAB | Facility: LAB | Age: 59
End: 2024-12-10
Payer: COMMERCIAL

## 2024-12-10 DIAGNOSIS — I10 PRIMARY HYPERTENSION: ICD-10-CM

## 2024-12-10 LAB
ANION GAP SERPL CALCULATED.3IONS-SCNC: 11 MMOL/L (ref 10–20)
BUN SERPL-MCNC: 19 MG/DL (ref 6–23)
CALCIUM SERPL-MCNC: 9.6 MG/DL (ref 8.6–10.3)
CHLORIDE SERPL-SCNC: 100 MMOL/L (ref 98–107)
CO2 SERPL-SCNC: 32 MMOL/L (ref 21–32)
CREAT SERPL-MCNC: 0.89 MG/DL (ref 0.5–1.05)
EGFRCR SERPLBLD CKD-EPI 2021: 75 ML/MIN/1.73M*2
GLUCOSE SERPL-MCNC: 92 MG/DL (ref 74–99)
POTASSIUM SERPL-SCNC: 4.1 MMOL/L (ref 3.5–5.3)
SODIUM SERPL-SCNC: 139 MMOL/L (ref 136–145)

## 2024-12-10 PROCEDURE — 80048 BASIC METABOLIC PNL TOTAL CA: CPT

## 2024-12-10 PROCEDURE — 36415 COLL VENOUS BLD VENIPUNCTURE: CPT

## 2024-12-20 ENCOUNTER — TELEPHONE (OUTPATIENT)
Dept: PRIMARY CARE | Facility: CLINIC | Age: 59
End: 2024-12-20

## 2024-12-20 ENCOUNTER — APPOINTMENT (OUTPATIENT)
Dept: PRIMARY CARE | Facility: CLINIC | Age: 59
End: 2024-12-20
Payer: COMMERCIAL

## 2024-12-20 VITALS
HEART RATE: 69 BPM | OXYGEN SATURATION: 97 % | SYSTOLIC BLOOD PRESSURE: 110 MMHG | WEIGHT: 189 LBS | BODY MASS INDEX: 32.44 KG/M2 | DIASTOLIC BLOOD PRESSURE: 76 MMHG

## 2024-12-20 DIAGNOSIS — I10 PRIMARY HYPERTENSION: ICD-10-CM

## 2024-12-20 DIAGNOSIS — Z23 ENCOUNTER FOR IMMUNIZATION: ICD-10-CM

## 2024-12-20 DIAGNOSIS — E66.811 CLASS 1 OBESITY WITH SERIOUS COMORBIDITY AND BODY MASS INDEX (BMI) OF 32.0 TO 32.9 IN ADULT, UNSPECIFIED OBESITY TYPE: ICD-10-CM

## 2024-12-20 DIAGNOSIS — I10 PRIMARY HYPERTENSION: Primary | ICD-10-CM

## 2024-12-20 DIAGNOSIS — K21.9 GASTROESOPHAGEAL REFLUX DISEASE WITHOUT ESOPHAGITIS: ICD-10-CM

## 2024-12-20 PROCEDURE — 90471 IMMUNIZATION ADMIN: CPT

## 2024-12-20 PROCEDURE — 99213 OFFICE O/P EST LOW 20 MIN: CPT

## 2024-12-20 PROCEDURE — 3074F SYST BP LT 130 MM HG: CPT

## 2024-12-20 PROCEDURE — 90750 HZV VACC RECOMBINANT IM: CPT

## 2024-12-20 PROCEDURE — 3078F DIAST BP <80 MM HG: CPT

## 2024-12-20 RX ORDER — OMEPRAZOLE 40 MG/1
40 CAPSULE, DELAYED RELEASE ORAL
Qty: 90 CAPSULE | Refills: 1 | Status: SHIPPED | OUTPATIENT
Start: 2024-12-20

## 2024-12-20 RX ORDER — HYDROCHLOROTHIAZIDE 12.5 MG/1
12.5 TABLET ORAL DAILY
Qty: 90 TABLET | Refills: 1 | Status: SHIPPED | OUTPATIENT
Start: 2024-12-20 | End: 2025-06-18

## 2024-12-20 ASSESSMENT — PATIENT HEALTH QUESTIONNAIRE - PHQ9
SUM OF ALL RESPONSES TO PHQ9 QUESTIONS 1 AND 2: 0
2. FEELING DOWN, DEPRESSED OR HOPELESS: NOT AT ALL
1. LITTLE INTEREST OR PLEASURE IN DOING THINGS: NOT AT ALL

## 2024-12-20 ASSESSMENT — PAIN SCALES - GENERAL: PAINLEVEL_OUTOF10: 0-NO PAIN

## 2024-12-20 NOTE — ASSESSMENT & PLAN NOTE
Orders:    omeprazole (PriLOSEC) 40 mg DR capsule; Take 1 capsule (40 mg) by mouth once daily in the morning. Take before meals.

## 2024-12-20 NOTE — PROGRESS NOTES
Subjective     Patient ID: Marni Harkins is a 59 y.o. female who presents for Hypertension.      HPI  Mrani presents for follow up for lower extremity edema and hypertension.  Added hydrochlorothiazide 12.5 mg at last visit.   BP stable today.  LE edema resolved.   Tolerating medications well.  Labs obtained prior to visit- electrolytes stable.  Denies change in vision, headache, or dizziness.   No complaints of chest pain, palpitations, or shortness of breath.    She also presents for shingles vaccine today.     Patient's recent visit notes, medication and allergy lists, past medical surgical social hx, immunization, vitals, problem list, recent tests were reviewed by me for pertinence to this visit.    Current Outpatient Medications:     fish oil concentrate (Fish OiL) 120-180 mg capsule, Take by mouth once daily., Disp: , Rfl:     hydroCHLOROthiazide (Microzide) 12.5 mg tablet, Take 1 tablet (12.5 mg) by mouth once daily., Disp: 30 tablet, Rfl: 1    losartan (Cozaar) 25 mg tablet, Take 1 tablet (25 mg) by mouth once daily., Disp: 90 tablet, Rfl: 1    omeprazole (PriLOSEC) 20 mg DR capsule, Take 1 capsule (20 mg) by mouth once daily in the morning. Take before meals., Disp: , Rfl:     meclizine (Antivert) 25 mg tablet, Take 1 tablet (25 mg) by mouth 3 times a day as needed for dizziness. (Patient not taking: Reported on 12/20/2024), Disp: 30 tablet, Rfl: 11      Review of Systems  All other systems have been reviewed and are negative except as noted in the HPI.         Objective   /76 (BP Location: Left arm)   Pulse 69   Wt 85.7 kg (189 lb)   SpO2 97%   BMI 32.44 kg/m²       Physical Exam  Vitals and nursing note reviewed.   Constitutional:       General: She is not in acute distress.     Appearance: Normal appearance.   Neck:      Vascular: No carotid bruit.   Cardiovascular:      Rate and Rhythm: Normal rate and regular rhythm.      Pulses: Normal pulses.      Heart sounds: Normal heart  sounds, S1 normal and S2 normal. No murmur heard.  Pulmonary:      Effort: Pulmonary effort is normal.      Breath sounds: Normal breath sounds and air entry.   Musculoskeletal:      Cervical back: Normal range of motion and neck supple.      Right lower leg: No edema.      Left lower leg: No edema.   Lymphadenopathy:      Cervical: No cervical adenopathy.   Skin:     General: Skin is warm and dry.   Neurological:      General: No focal deficit present.      Mental Status: She is alert. Mental status is at baseline.   Psychiatric:         Behavior: Behavior is cooperative.             Assessment & Plan  Primary hypertension  Chronic condition, stable at this visit  Continue losartan 25 mg daily and hydrochlorothiazide 12.5 mg daily daily as prescribed.  Monitor home blood pressures.  Call if blood pressure consistently >140/90.  Non pharmacological interventions such as lowering salt, saturated fats, cholesterol, and sugar diet discussed.  Labs reviewed.  Electrolytes stable.   Increase physical activity, aim for 30 minutes 5 days per week as able.  Stress reduction interventions discussed.  Discussed signs and symptoms of major cardiovascular event and need to present to the ED.   Reevaluate in 6 months.    Orders:    hydroCHLOROthiazide (Microzide) 12.5 mg tablet; Take 1 tablet (12.5 mg) by mouth once daily.    Class 1 obesity with serious comorbidity and body mass index (BMI) of 32.0 to 32.9 in adult, unspecified obesity type  She has concerns for weight gain over the last year. Will obtain labs to r/o physiological cause. Discussed diet and exercise interventions at length.   Orders:    TSH with reflex to Free T4 if abnormal; Future    Encounter for immunization    Orders:    Zoster vaccine, recombinant, adult (SHINGRIX)          Patient understands and agrees with treatment plan.    Sandra Ndiaye, APRN-CNP

## 2025-01-09 ENCOUNTER — LAB (OUTPATIENT)
Dept: LAB | Facility: LAB | Age: 60
End: 2025-01-09
Payer: COMMERCIAL

## 2025-01-09 DIAGNOSIS — E66.811 CLASS 1 OBESITY WITH SERIOUS COMORBIDITY AND BODY MASS INDEX (BMI) OF 32.0 TO 32.9 IN ADULT, UNSPECIFIED OBESITY TYPE: ICD-10-CM

## 2025-01-09 LAB — TSH SERPL-ACNC: 1.4 MIU/L (ref 0.44–3.98)

## 2025-01-09 PROCEDURE — 84443 ASSAY THYROID STIM HORMONE: CPT

## 2025-01-21 ENCOUNTER — TELEPHONE (OUTPATIENT)
Dept: PRIMARY CARE | Facility: CLINIC | Age: 60
End: 2025-01-21
Payer: COMMERCIAL

## 2025-01-21 NOTE — TELEPHONE ENCOUNTER
Pt called she woke up over night with stomach cramping nausea vomiting and diarrhea wants to know if there is a virus going around I told her there are various viruses going around, advised UC tonight or can schedule an appointment tomorrow she will see how she feels and go to UC tonight if not better

## 2025-01-24 ENCOUNTER — OFFICE VISIT (OUTPATIENT)
Dept: URGENT CARE | Age: 60
End: 2025-01-24
Payer: COMMERCIAL

## 2025-01-24 VITALS
HEART RATE: 83 BPM | RESPIRATION RATE: 20 BRPM | OXYGEN SATURATION: 98 % | TEMPERATURE: 98.5 F | DIASTOLIC BLOOD PRESSURE: 72 MMHG | SYSTOLIC BLOOD PRESSURE: 154 MMHG

## 2025-01-24 DIAGNOSIS — K52.9 GASTROENTERITIS: Primary | ICD-10-CM

## 2025-01-24 DIAGNOSIS — R11.2 NAUSEA AND VOMITING, UNSPECIFIED VOMITING TYPE: ICD-10-CM

## 2025-01-24 RX ORDER — ONDANSETRON 4 MG/1
4 TABLET, ORALLY DISINTEGRATING ORAL EVERY 8 HOURS PRN
Qty: 20 TABLET | Refills: 0 | Status: SHIPPED | OUTPATIENT
Start: 2025-01-24 | End: 2025-01-31

## 2025-01-24 RX ORDER — FAMOTIDINE 20 MG/1
20 TABLET, FILM COATED ORAL DAILY
Qty: 14 TABLET | Refills: 0 | Status: SHIPPED | OUTPATIENT
Start: 2025-01-24 | End: 2025-02-07

## 2025-01-24 NOTE — PROGRESS NOTES
Chief Complaint   Patient presents with    Illness     X 4 days, stomach cramps, diarrhea, vomiting. Can't keep food down. Hx of Acid of Reflux.        Reports having ongoing vomiting and diarrhea. Able to keep fluids down and some food. Reports having upper and lower scope done on February 3rd.     REVIEW OF SYSTEMS:    CONSTITUTIONAL:  No fever. No chills. No dizziness. No weakness.  EYES:  No pain, erythema, or discharge. No blurring of vision.  ENT:  No sore throat, URI symptoms. No epistaxis. No tinnitus.  CARDIOVASCULAR:  No chest pain. No palpitations. No lower extremity edema.  RESPIRATORY:  No shortness of breath, cough, pain with respiration, pleuritic chest pain. No hemoptysis. No dyspnea. No paroxysmal nocturnal dyspnea.  GASTROINTESTINAL:  Decreased appetite. No nausea, YES vomiting, YES diarrhea. YEs pain-generalized. YES bloating. No melena.  GENITOURINARY:  No frequency, urgency, nocturia. No hematuria or dysuria.  MUSCULOSKELETAL:  No arthralgias or myalgias.  INTEGUMENTARY:  No swelling. No bruising. No contusions. No abrasions. No lymphangitis.  NEUROLOGIC:  No headache. No neck pain. No numbness or tingling of the extremities. No weakness.  PSYCHIATRIC:  No confusion.  ENDOCRINE:  No fatigue. No weakness. No history of thyroid, diabetes or adrenal problems.  HEMATOLOGICAL:  No bleeding. No petechiae. No bruising.  ALLERGIES:  No asthma. No urticaria.      Physical Exam  Vitals reviewed.   General: no distress  Cardiovascular:     Heart sounds: Normal heart sounds, S1 normal and S2 normal. No murmur heard.     No friction rub.   Pulmonary:      Effort: Pulmonary effort is normal.      Breath sounds: Normal breath sounds and air entry. Lungs clear to auscultation bilaterally   ENT:  Bilateral TMs and canals unremarkable, No sinus congestion present. Pharynx and tonsils are not hyperemic, and without exudate.   Abdominal:      Palpations: There is no hepatomegaly, splenomegaly or mass. Abdomen is  soft, non-tender, and non-distended. No suprapubic tenderness. No CVA tenderness. No guarding, no rigidity.   Lymphadenopathy:      Lower Body: No right inguinal adenopathy. No left inguinal adenopathy.   Skin:     Comments: no rash   Neurological:      Cranial Nerves: Cranial nerves 2-12 are intact.      Sensory: No sensory deficit.      Motor: Motor function is intact.      Deep Tendon Reflexes: Reflexes are normal and symmetric.       Assessment:     Encounter Diagnoses   Name Primary?    Gastroenteritis Yes    Nausea and vomiting, unspecified vomiting type           Medical Decision Making & Plan:     Nausea/vomiting:  Ondansetron PRN  Gastroenteritis:  Advised on BLAND diet, fluids,   GERD:  Add Pepcid once daily for 14 days as a trial, follow up with PCP for further management, proceed to ER for worsening symptoms or new onset of fever, severe abdominal pain        01/24/25 at 5:16 PM - BILL Lares-CNP

## 2025-03-20 ENCOUNTER — TELEPHONE (OUTPATIENT)
Dept: PRIMARY CARE | Facility: CLINIC | Age: 60
End: 2025-03-20
Payer: COMMERCIAL

## 2025-03-20 NOTE — TELEPHONE ENCOUNTER
Patient called and stated she's not sure if her BP medication is making her body hurt. She's having pain in her Legs, left hip hurt and in the middle of Shoulder blade in the back for 2 weeks, she's been using heat at night and getting massages.    Patient can be reached at [phone] after 4:00 PM and [phone] before 4:00 PM

## 2025-03-21 ENCOUNTER — OFFICE VISIT (OUTPATIENT)
Dept: PRIMARY CARE | Facility: CLINIC | Age: 60
End: 2025-03-21
Payer: COMMERCIAL

## 2025-03-21 VITALS
DIASTOLIC BLOOD PRESSURE: 78 MMHG | SYSTOLIC BLOOD PRESSURE: 126 MMHG | WEIGHT: 191 LBS | TEMPERATURE: 98.2 F | OXYGEN SATURATION: 99 % | BODY MASS INDEX: 32.79 KG/M2 | HEART RATE: 73 BPM

## 2025-03-21 DIAGNOSIS — M54.6 ACUTE MIDLINE THORACIC BACK PAIN: Primary | ICD-10-CM

## 2025-03-21 DIAGNOSIS — M79.89 LEG SWELLING: ICD-10-CM

## 2025-03-21 DIAGNOSIS — R42 DIZZINESS: ICD-10-CM

## 2025-03-21 DIAGNOSIS — M25.552 PAIN OF LEFT HIP JOINT: ICD-10-CM

## 2025-03-21 DIAGNOSIS — R25.2 MUSCLE CRAMPS: ICD-10-CM

## 2025-03-21 DIAGNOSIS — M25.50 PAIN IN JOINT INVOLVING MULTIPLE SITES: ICD-10-CM

## 2025-03-21 RX ORDER — CYCLOBENZAPRINE HCL 5 MG
5 TABLET ORAL NIGHTLY PRN
Qty: 30 TABLET | Refills: 0 | Status: SHIPPED | OUTPATIENT
Start: 2025-03-21 | End: 2025-05-20

## 2025-03-21 ASSESSMENT — ENCOUNTER SYMPTOMS
NECK PAIN: 0
FEVER: 0
ARTHRALGIAS: 1
NAUSEA: 0
MYALGIAS: 1
HEADACHES: 0
DIARRHEA: 0
VOMITING: 0
LIGHT-HEADEDNESS: 0
CHILLS: 0
JOINT SWELLING: 0
NECK STIFFNESS: 0
COUGH: 0
BACK PAIN: 1
PALPITATIONS: 0
DIZZINESS: 1

## 2025-03-21 ASSESSMENT — PAIN SCALES - GENERAL: PAINLEVEL_OUTOF10: 4

## 2025-03-21 NOTE — PROGRESS NOTES
Subjective   Patient ID: Marni Harkins is a 59 y.o. female who presents for Generalized Body Aches (C/o bilateral shin pain, middle shoulder blade pain, dizziness x 2 weeks/).    Marni is a 58 yo patient presenting for thoracic pain, dizziness, and leg cramps x2 weeks.     2 weeks ago the pain began in the neck and moved down to the middle of her shoulder blades. Has been using massage and heating pad and it is helping.   Has been trying to increase her exercise recently to lose weight.      Dizziness began this week, states it happens at random times. Not related to movement or activity. She states she has been very stressed from dealing with her mother this week and it unsure if it is related to that.     Increased joint pain for the last couple of weeks - worse in left hip and bilateral knees but states all her joints are aching. Has full ROM. Denies any fevers, or sickness recently.    Also complains of leg swelling in the evenings after standing all day.       Patient Care Team:  RADHA White as PCP - General (Family Medicine)  Vicky Cruz PA-C as Primary Care Provider    PMH, PSH, family history and social history were reviewed and updated.    Review of Systems   Constitutional:  Negative for chills and fever.   HENT: Negative.     Respiratory:  Negative for cough.    Cardiovascular:  Positive for leg swelling. Negative for chest pain and palpitations.   Gastrointestinal:  Negative for diarrhea, nausea and vomiting.   Musculoskeletal:  Positive for arthralgias, back pain and myalgias. Negative for joint swelling, neck pain and neck stiffness.   Neurological:  Positive for dizziness. Negative for light-headedness and headaches.       Objective   /78   Pulse 73   Temp 36.8 °C (98.2 °F)   Wt 86.6 kg (191 lb)   SpO2 99%   BMI 32.79 kg/m²     Physical Exam  Vitals and nursing note reviewed.   Constitutional:       General: She is not in acute distress.     Appearance: Normal  appearance. She is not ill-appearing.   HENT:      Head: Normocephalic and atraumatic.   Cardiovascular:      Rate and Rhythm: Normal rate and regular rhythm.      Heart sounds: Normal heart sounds. No murmur heard.     No friction rub. No gallop.   Pulmonary:      Effort: Pulmonary effort is normal. No respiratory distress.      Breath sounds: Normal breath sounds. No stridor. No wheezing, rhonchi or rales.   Chest:      Chest wall: No tenderness.   Musculoskeletal:         General: No swelling, tenderness, deformity or signs of injury. Normal range of motion.      Cervical back: Normal range of motion. No rigidity or tenderness.      Right lower leg: No edema.      Left lower leg: No edema.   Skin:     General: Skin is warm and dry.      Findings: No bruising, lesion or rash.   Neurological:      General: No focal deficit present.      Mental Status: She is alert and oriented to person, place, and time.      Sensory: No sensory deficit.      Motor: No weakness.   Psychiatric:         Mood and Affect: Mood normal.         Behavior: Behavior normal.         Assessment/Plan   Assessment & Plan  Acute midline thoracic back pain    Orders:    cyclobenzaprine (Flexeril) 5 mg tablet; Take 1 tablet (5 mg) by mouth as needed at bedtime for muscle spasms.    Patient has had pain like this before which was helped by flexeril. Sent to pharmacy - discussed side effects and proper dosing.     Most likely muscle related as it is improving with heat and rest. Pain only present with active movement.     Advised continuing supportive care of rest, heat and massage.   Pain of left hip joint    Most likely acute on chronic exacerbation of pain. Denies injury. Has always had problems with this hip - likely related to osteoarthritis. Continue supportive care and using tylenol/ibuprofen as needed.     With presence of other joint pains as well, will get inflammatory markers/ephraim to r/o rheumatologic source of pain.   Will await results        Pain in joint involving multiple sites    Orders:    Sedimentation Rate; Future    C-reactive protein; Future    EPHRAIM with Reflex to ALBERT; Future    Patient complains of multiple joints bothering her recently. Will get ephraim/inflammatory markers to r/o rheumatologic source of pain  Will await results    If negative, most likely related to arthritis -- continue supportive care and working on diet and exercise.   Muscle cramps    Orders:    Comprehensive metabolic panel; Future    cyclobenzaprine (Flexeril) 5 mg tablet; Take 1 tablet (5 mg) by mouth as needed at bedtime for muscle spasms.    Patient is on 2 bp meds. Will ensure electrolytes are within normal ranges. Will call pt with results  Advised making sure she drinks enough water and staying active in the mean time.     Leg swelling    Due to the swelling appearing in the evenings after being on her feet, suspect some dependent edema.     Advised use of compression stockings during long work days.   Please follow up with PCP if this symptom worsens ir compression stockings do not improve symptoms.        Dizziness    Unsure the source of dizziness at this time, does not seem like vertigo as it is not aggravated by moving her head.     Patient will keep an eye on when these symptoms happen and take BP if able.     Advised drinking enough water and eating food regularly. Patient will call office if symptoms worsen.          Follow up as scheduled with PCP, sooner with any problems or concerns.

## 2025-03-23 LAB
ALBUMIN SERPL-MCNC: 4.6 G/DL (ref 3.6–5.1)
ALP SERPL-CCNC: 27 U/L (ref 37–153)
ALT SERPL-CCNC: 20 U/L (ref 6–29)
ANA SER QL IF: NORMAL
ANION GAP SERPL CALCULATED.4IONS-SCNC: 9 MMOL/L (CALC) (ref 7–17)
AST SERPL-CCNC: 16 U/L (ref 10–35)
BILIRUB SERPL-MCNC: 0.3 MG/DL (ref 0.2–1.2)
BUN SERPL-MCNC: 15 MG/DL (ref 7–25)
CALCIUM SERPL-MCNC: 9.4 MG/DL (ref 8.6–10.4)
CHLORIDE SERPL-SCNC: 101 MMOL/L (ref 98–110)
CO2 SERPL-SCNC: 31 MMOL/L (ref 20–32)
CREAT SERPL-MCNC: 0.79 MG/DL (ref 0.5–1.03)
CRP SERPL-MCNC: NORMAL MG/L
EGFRCR SERPLBLD CKD-EPI 2021: 86 ML/MIN/1.73M2
ERYTHROCYTE [SEDIMENTATION RATE] IN BLOOD BY WESTERGREN METHOD: 9 MM/H
GLUCOSE SERPL-MCNC: 95 MG/DL (ref 65–139)
POTASSIUM SERPL-SCNC: 4.2 MMOL/L (ref 3.5–5.3)
PROT SERPL-MCNC: 7.1 G/DL (ref 6.1–8.1)
SODIUM SERPL-SCNC: 141 MMOL/L (ref 135–146)

## 2025-03-24 LAB
ALBUMIN SERPL-MCNC: 4.6 G/DL (ref 3.6–5.1)
ALP SERPL-CCNC: 27 U/L (ref 37–153)
ALT SERPL-CCNC: 20 U/L (ref 6–29)
ANA SER QL IF: NEGATIVE
ANION GAP SERPL CALCULATED.4IONS-SCNC: 9 MMOL/L (CALC) (ref 7–17)
AST SERPL-CCNC: 16 U/L (ref 10–35)
BILIRUB SERPL-MCNC: 0.3 MG/DL (ref 0.2–1.2)
BUN SERPL-MCNC: 15 MG/DL (ref 7–25)
CALCIUM SERPL-MCNC: 9.4 MG/DL (ref 8.6–10.4)
CHLORIDE SERPL-SCNC: 101 MMOL/L (ref 98–110)
CO2 SERPL-SCNC: 31 MMOL/L (ref 20–32)
CREAT SERPL-MCNC: 0.79 MG/DL (ref 0.5–1.03)
CRP SERPL-MCNC: <3 MG/L
EGFRCR SERPLBLD CKD-EPI 2021: 86 ML/MIN/1.73M2
ERYTHROCYTE [SEDIMENTATION RATE] IN BLOOD BY WESTERGREN METHOD: 9 MM/H
GLUCOSE SERPL-MCNC: 95 MG/DL (ref 65–139)
POTASSIUM SERPL-SCNC: 4.2 MMOL/L (ref 3.5–5.3)
PROT SERPL-MCNC: 7.1 G/DL (ref 6.1–8.1)
SODIUM SERPL-SCNC: 141 MMOL/L (ref 135–146)

## 2025-03-28 DIAGNOSIS — I10 PRIMARY HYPERTENSION: ICD-10-CM

## 2025-03-31 RX ORDER — HYDROCHLOROTHIAZIDE 12.5 MG/1
12.5 TABLET ORAL DAILY
Qty: 90 TABLET | Refills: 1 | Status: SHIPPED | OUTPATIENT
Start: 2025-03-31

## 2025-04-02 DIAGNOSIS — I10 PRIMARY HYPERTENSION: ICD-10-CM

## 2025-04-02 RX ORDER — LOSARTAN POTASSIUM 25 MG/1
25 TABLET ORAL DAILY
Qty: 90 TABLET | Refills: 1 | Status: SHIPPED | OUTPATIENT
Start: 2025-04-02

## 2025-04-22 DIAGNOSIS — M54.6 ACUTE MIDLINE THORACIC BACK PAIN: ICD-10-CM

## 2025-04-22 DIAGNOSIS — R25.2 MUSCLE CRAMPS: ICD-10-CM

## 2025-04-23 RX ORDER — CYCLOBENZAPRINE HCL 5 MG
5 TABLET ORAL NIGHTLY PRN
Qty: 30 TABLET | Refills: 0 | Status: SHIPPED | OUTPATIENT
Start: 2025-04-23 | End: 2025-06-22

## 2025-05-19 ENCOUNTER — APPOINTMENT (OUTPATIENT)
Dept: PRIMARY CARE | Facility: CLINIC | Age: 60
End: 2025-05-19
Payer: COMMERCIAL

## 2025-05-21 ENCOUNTER — TELEPHONE (OUTPATIENT)
Dept: PRIMARY CARE | Facility: CLINIC | Age: 60
End: 2025-05-21
Payer: COMMERCIAL

## 2025-05-21 DIAGNOSIS — I10 PRIMARY HYPERTENSION: Primary | ICD-10-CM

## 2025-05-21 DIAGNOSIS — E78.00 PURE HYPERCHOLESTEROLEMIA: ICD-10-CM

## 2025-05-21 NOTE — TELEPHONE ENCOUNTER
Patient has an appointment with [facility] on 05/27/25 for a HTN check. There are labs that are active/ future in the computer from 12/2024 however did not cross over for [facility]. She was at the the lab today and called right before we closed. Can a new order be put in ? Call patient in the morning to let her know this is done so she can go get her BW done.

## 2025-05-24 LAB
ANION GAP SERPL CALCULATED.4IONS-SCNC: 12 MMOL/L (CALC) (ref 7–17)
BUN SERPL-MCNC: 14 MG/DL (ref 7–25)
BUN/CREAT SERPL: NORMAL (CALC) (ref 6–22)
CALCIUM SERPL-MCNC: 9.4 MG/DL (ref 8.6–10.4)
CHLORIDE SERPL-SCNC: 101 MMOL/L (ref 98–110)
CHOLEST SERPL-MCNC: 297 MG/DL
CHOLEST/HDLC SERPL: 3.8 (CALC)
CO2 SERPL-SCNC: 27 MMOL/L (ref 20–32)
CREAT SERPL-MCNC: 0.81 MG/DL (ref 0.5–1.03)
EGFRCR SERPLBLD CKD-EPI 2021: 84 ML/MIN/1.73M2
GLUCOSE SERPL-MCNC: 88 MG/DL (ref 65–99)
HDLC SERPL-MCNC: 79 MG/DL
LDLC SERPL CALC-MCNC: 186 MG/DL (CALC)
NONHDLC SERPL-MCNC: 218 MG/DL (CALC)
POTASSIUM SERPL-SCNC: 4.2 MMOL/L (ref 3.5–5.3)
SODIUM SERPL-SCNC: 140 MMOL/L (ref 135–146)
TRIGL SERPL-MCNC: 169 MG/DL

## 2025-05-27 ENCOUNTER — APPOINTMENT (OUTPATIENT)
Dept: PRIMARY CARE | Facility: CLINIC | Age: 60
End: 2025-05-27
Payer: COMMERCIAL

## 2025-05-27 ENCOUNTER — TELEPHONE (OUTPATIENT)
Dept: PRIMARY CARE | Facility: CLINIC | Age: 60
End: 2025-05-27

## 2025-05-27 VITALS
DIASTOLIC BLOOD PRESSURE: 84 MMHG | TEMPERATURE: 98 F | BODY MASS INDEX: 32.78 KG/M2 | WEIGHT: 192 LBS | HEIGHT: 64 IN | OXYGEN SATURATION: 99 % | HEART RATE: 77 BPM | SYSTOLIC BLOOD PRESSURE: 132 MMHG

## 2025-05-27 DIAGNOSIS — E78.00 PURE HYPERCHOLESTEROLEMIA: ICD-10-CM

## 2025-05-27 DIAGNOSIS — E78.2 MIXED HYPERLIPIDEMIA: ICD-10-CM

## 2025-05-27 DIAGNOSIS — K21.9 GASTROESOPHAGEAL REFLUX DISEASE WITHOUT ESOPHAGITIS: ICD-10-CM

## 2025-05-27 DIAGNOSIS — I10 PRIMARY HYPERTENSION: ICD-10-CM

## 2025-05-27 DIAGNOSIS — R73.01 IMPAIRED FASTING GLUCOSE: ICD-10-CM

## 2025-05-27 DIAGNOSIS — I10 PRIMARY HYPERTENSION: Primary | ICD-10-CM

## 2025-05-27 DIAGNOSIS — M54.12 CERVICAL RADICULOPATHY: ICD-10-CM

## 2025-05-27 PROCEDURE — G8433 SCR FOR DEP NOT CPT DOC RSN: HCPCS

## 2025-05-27 PROCEDURE — 3008F BODY MASS INDEX DOCD: CPT

## 2025-05-27 PROCEDURE — 99214 OFFICE O/P EST MOD 30 MIN: CPT

## 2025-05-27 PROCEDURE — 3079F DIAST BP 80-89 MM HG: CPT

## 2025-05-27 PROCEDURE — 3075F SYST BP GE 130 - 139MM HG: CPT

## 2025-05-27 RX ORDER — OMEPRAZOLE 40 MG/1
40 CAPSULE, DELAYED RELEASE ORAL
Qty: 90 CAPSULE | Refills: 1 | Status: SHIPPED | OUTPATIENT
Start: 2025-05-27

## 2025-05-27 RX ORDER — LOSARTAN POTASSIUM 25 MG/1
25 TABLET ORAL DAILY
Qty: 90 TABLET | Refills: 1 | Status: SHIPPED | OUTPATIENT
Start: 2025-05-27

## 2025-05-27 RX ORDER — HYDROCHLOROTHIAZIDE 12.5 MG/1
12.5 TABLET ORAL DAILY
Qty: 90 TABLET | Refills: 1 | Status: SHIPPED | OUTPATIENT
Start: 2025-05-27

## 2025-05-27 RX ORDER — PREDNISONE 20 MG/1
20 TABLET ORAL 2 TIMES DAILY
Qty: 10 TABLET | Refills: 0 | Status: SHIPPED | OUTPATIENT
Start: 2025-05-27 | End: 2025-06-01

## 2025-05-27 ASSESSMENT — COLUMBIA-SUICIDE SEVERITY RATING SCALE - C-SSRS
6. HAVE YOU EVER DONE ANYTHING, STARTED TO DO ANYTHING, OR PREPARED TO DO ANYTHING TO END YOUR LIFE?: NO
1. IN THE PAST MONTH, HAVE YOU WISHED YOU WERE DEAD OR WISHED YOU COULD GO TO SLEEP AND NOT WAKE UP?: NO
2. HAVE YOU ACTUALLY HAD ANY THOUGHTS OF KILLING YOURSELF?: NO

## 2025-05-27 NOTE — ASSESSMENT & PLAN NOTE
Chronic problem, needing tighter management  Discussed dietary interventions and exercise at length for reducing total cholesterol, triglycerides and LDL.  ASCVD risk is low at this visit at 4.5%.  We will hold off on medications at this time as she would like to work on diet and lifestyle interventions for management.

## 2025-05-27 NOTE — ASSESSMENT & PLAN NOTE
Chronic condition, well controlled at this visit.   Continue omeprazole 40 mg daily as prescribed.   Discussed diet and lifestyle interventions for reducing gastric reflux symptoms.  Follow up in 6 months.     Orders:    omeprazole (PriLOSEC) 40 mg DR capsule; Take 1 capsule (40 mg) by mouth once daily in the morning. Take before meals.

## 2025-05-27 NOTE — PROGRESS NOTES
"Subjective     Patient ID: Marni Harkins is a 59 y.o. female who presents for Hypertension (Medication questions ).      HPI    Marni presents for 6-month interval follow-up of chronic conditions.  She does have concerns today for some persistent neck pain which radiates into her upper back.  She has tried NSAIDs, massage, heat, stretching with little improvement.  She has also bought new pillows to help with her discomfort.  She denies any known injuries/trauma.    Marni presents for follow up of essential hypertension.   Taking losartan 25 mg daily and hydrochlorothiazide 12.5 mg daily  Tolerating medications well.  Denies change in vision, headache, or dizziness.   No complaints of chest pain, palpitations, or shortness of breath.    Hyperlipidemia Review:  Lipid panel: Total cholesterol 297, HDL 79, triglycerides 169,   Medications: No current medications  ASCVD risk: 4.5%- low risk    Patient's recent visit notes, medication and allergy lists, past medical surgical social hx, immunization, vitals, problem list, recent tests were reviewed by me for pertinence to this visit.        Review of Systems  All other systems have been reviewed and are negative except as noted in the HPI.         Objective   /84 (BP Location: Left arm, Patient Position: Sitting, BP Cuff Size: Adult)   Pulse 77   Temp 36.7 °C (98 °F) (Temporal)   Ht 1.626 m (5' 4\")   Wt 87.1 kg (192 lb)   SpO2 99%   BMI 32.96 kg/m²       Physical Exam  Vitals and nursing note reviewed.   Constitutional:       General: She is not in acute distress.     Appearance: Normal appearance.   Neck:      Vascular: No carotid bruit.   Cardiovascular:      Rate and Rhythm: Normal rate and regular rhythm.      Pulses: Normal pulses.      Heart sounds: Normal heart sounds, S1 normal and S2 normal. No murmur heard.  Pulmonary:      Effort: Pulmonary effort is normal.      Breath sounds: Normal breath sounds and air entry. "   Musculoskeletal:      Cervical back: Normal range of motion and neck supple.      Right lower leg: No edema.      Left lower leg: No edema.   Lymphadenopathy:      Cervical: No cervical adenopathy.   Skin:     General: Skin is warm and dry.   Neurological:      General: No focal deficit present.      Mental Status: She is alert. Mental status is at baseline.   Psychiatric:         Behavior: Behavior is cooperative.             Assessment & Plan  Primary hypertension  Chronic condition, stable at this visit  Continue hydrochlorothiazide 12.5 mg daily, losartan 25 mg daily daily as prescribed.  Monitor home blood pressures.  Call if blood pressure consistently >140/90.  Non pharmacological interventions such as lowering salt, saturated fats, cholesterol, and sugar diet discussed.  Increase physical activity, aim for 30 minutes 5 days per week as able.  Stress reduction interventions discussed.  Discussed signs and symptoms of major cardiovascular event and need to present to the ED.   Reevaluate in 6 months.    Orders:    hydroCHLOROthiazide (Microzide) 12.5 mg tablet; Take 1 tablet (12.5 mg) by mouth once daily.    losartan (Cozaar) 25 mg tablet; Take 1 tablet (25 mg) by mouth once daily.    Pure hypercholesterolemia  Chronic problem, needing tighter management  Discussed dietary interventions and exercise at length for reducing total cholesterol, triglycerides and LDL.  ASCVD risk is low at this visit at 4.5%.  We will hold off on medications at this time as she would like to work on diet and lifestyle interventions for management.       Cervical radiculopathy  Acute  Short course of prednisone for inflammation  May use Flexeril 5 mg as needed for muscle spasms  Explained intended effects, potential side effects, and schedule of dosages of the medication.  X-rays as discussed, will follow-up with results.  Referral to physical therapy for strengthening and range of motion  Discussed nonpharmacological  interventions for pain relief including massage, heat, and stretching.  May use topical analgesics such as Salonpas patches, Blue emu, IcyHot, or capsaicin cream.  Follow-up in 2 to 3 weeks if symptoms persist.    Orders:    XR cervical spine complete 4-5 views; Future    Referral to Physical Therapy; Future    predniSONE (Deltasone) 20 mg tablet; Take 1 tablet (20 mg) by mouth 2 times a day for 5 days.    Gastroesophageal reflux disease without esophagitis  Chronic condition, well controlled at this visit.   Continue omeprazole 40 mg daily as prescribed.   Discussed diet and lifestyle interventions for reducing gastric reflux symptoms.  Follow up in 6 months.     Orders:    omeprazole (PriLOSEC) 40 mg DR capsule; Take 1 capsule (40 mg) by mouth once daily in the morning. Take before meals.      Sandra Ndiaye, APRN-CNP

## 2025-05-28 ENCOUNTER — HOSPITAL ENCOUNTER (OUTPATIENT)
Dept: RADIOLOGY | Facility: HOSPITAL | Age: 60
Discharge: HOME | End: 2025-05-28
Payer: COMMERCIAL

## 2025-05-28 DIAGNOSIS — M54.12 CERVICAL RADICULOPATHY: ICD-10-CM

## 2025-05-28 PROCEDURE — 72050 X-RAY EXAM NECK SPINE 4/5VWS: CPT | Performed by: RADIOLOGY

## 2025-05-28 PROCEDURE — 72050 X-RAY EXAM NECK SPINE 4/5VWS: CPT

## 2025-08-20 ENCOUNTER — TELEPHONE (OUTPATIENT)
Dept: PRIMARY CARE | Facility: CLINIC | Age: 60
End: 2025-08-20
Payer: COMMERCIAL

## 2025-10-27 ENCOUNTER — APPOINTMENT (OUTPATIENT)
Dept: PRIMARY CARE | Facility: CLINIC | Age: 60
End: 2025-10-27
Payer: COMMERCIAL